# Patient Record
Sex: MALE | Race: WHITE | HISPANIC OR LATINO | Employment: FULL TIME | URBAN - METROPOLITAN AREA
[De-identification: names, ages, dates, MRNs, and addresses within clinical notes are randomized per-mention and may not be internally consistent; named-entity substitution may affect disease eponyms.]

---

## 2022-12-16 ENCOUNTER — HOSPITAL ENCOUNTER (EMERGENCY)
Facility: HOSPITAL | Age: 22
Discharge: HOME | End: 2022-12-16
Attending: EMERGENCY MEDICINE

## 2022-12-16 VITALS
DIASTOLIC BLOOD PRESSURE: 87 MMHG | WEIGHT: 150 LBS | HEART RATE: 92 BPM | BODY MASS INDEX: 22.73 KG/M2 | SYSTOLIC BLOOD PRESSURE: 143 MMHG | OXYGEN SATURATION: 100 % | TEMPERATURE: 98 F | RESPIRATION RATE: 18 BRPM | HEIGHT: 68 IN

## 2022-12-16 DIAGNOSIS — S01.511A LIP LACERATION, INITIAL ENCOUNTER: Primary | ICD-10-CM

## 2022-12-16 PROCEDURE — 90471 IMMUNIZATION ADMIN: CPT | Performed by: PHYSICIAN ASSISTANT

## 2022-12-16 PROCEDURE — 90715 TDAP VACCINE 7 YRS/> IM: CPT | Performed by: PHYSICIAN ASSISTANT

## 2022-12-16 PROCEDURE — 12013 RPR F/E/E/N/L/M 2.6-5.0 CM: CPT

## 2022-12-16 PROCEDURE — 12042 INTMD RPR N-HF/GENIT2.6-7.5: CPT

## 2022-12-16 PROCEDURE — 99284 EMERGENCY DEPT VISIT MOD MDM: CPT | Mod: 25

## 2022-12-16 PROCEDURE — 25000003 PHARM REV CODE 250: Performed by: PHYSICIAN ASSISTANT

## 2022-12-16 PROCEDURE — 63600175 PHARM REV CODE 636 W HCPCS: Performed by: PHYSICIAN ASSISTANT

## 2022-12-16 RX ORDER — LIDOCAINE HYDROCHLORIDE 10 MG/ML
10 INJECTION, SOLUTION EPIDURAL; INFILTRATION; INTRACAUDAL; PERINEURAL
Status: COMPLETED | OUTPATIENT
Start: 2022-12-16 | End: 2022-12-16

## 2022-12-16 RX ORDER — CEPHALEXIN 500 MG/1
500 CAPSULE ORAL
Status: COMPLETED | OUTPATIENT
Start: 2022-12-16 | End: 2022-12-16

## 2022-12-16 RX ORDER — CEPHALEXIN 500 MG/1
500 CAPSULE ORAL EVERY 8 HOURS
Qty: 15 CAPSULE | Refills: 0 | Status: SHIPPED | OUTPATIENT
Start: 2022-12-16 | End: 2022-12-21

## 2022-12-16 RX ORDER — IBUPROFEN 600 MG/1
600 TABLET ORAL EVERY 6 HOURS PRN
Qty: 30 TABLET | Refills: 0 | Status: SHIPPED | OUTPATIENT
Start: 2022-12-16

## 2022-12-16 RX ADMIN — TETANUS TOXOID, REDUCED DIPHTHERIA TOXOID AND ACELLULAR PERTUSSIS VACCINE, ADSORBED 0.5 ML: 5; 2.5; 8; 8; 2.5 SUSPENSION INTRAMUSCULAR at 09:12

## 2022-12-16 RX ADMIN — CEPHALEXIN 500 MG: 500 CAPSULE ORAL at 09:12

## 2022-12-16 RX ADMIN — LIDOCAINE HYDROCHLORIDE 100 MG: 10 INJECTION, SOLUTION EPIDURAL; INFILTRATION; INTRACAUDAL; PERINEURAL at 09:12

## 2022-12-16 NOTE — ED NOTES
"Patient presents to ED from work due to injury to right side of upper lip and face from work related accident. Patient received "kick back from a chop saw while cutting a water line at work". Patient awake and alert, in stable condition. Patient is able to speak in full sentences, but states it is painful, denies chest pain or SOB. Bleeding appears controlled at this time. Laceration tray set up @ bedside.   "

## 2022-12-16 NOTE — DISCHARGE INSTRUCTIONS

## 2022-12-16 NOTE — ED NOTES
Discharge instructions and prescriptions reviewed with patient. Patient verbalizes understanding.  MARGARET

## 2022-12-16 NOTE — ED PROVIDER NOTES
"Encounter Date: 12/16/2022       History     Chief Complaint   Patient presents with    Laceration     Laceration noted to right upper lip and right side of face, from kick back from a chop saw while cutting a water line at work, unknown tetanus status      22-year-old male presents ED with concern of laceration to right side of upper lip obtained just prior to arrival while attempting to cut concrete with large "concrete saw".  Bleeding quickly controlled with pressure.  Pain is sharp, worse with touch, nonradiating, severity 7/10.  Denying facial numbness.  Unsure of last tetanus.  No other acute lines at this time.      Review of patient's allergies indicates:  No Known Allergies  No past medical history on file.  No past surgical history on file.  No family history on file.  Social History     Tobacco Use    Smoking status: Every Day     Types: Cigarettes, Vaping with nicotine    Smokeless tobacco: Never   Substance Use Topics    Drug use: Yes     Types: Marijuana     Review of Systems   Skin:  Positive for wound.   Neurological:  Negative for weakness and numbness.     Physical Exam     Initial Vitals [12/16/22 0909]   BP Pulse Resp Temp SpO2   (!) 143/87 92 18 97.9 °F (36.6 °C) 100 %      MAP       --         Physical Exam    Nursing note and vitals reviewed.  Constitutional: Vital signs are normal. He appears well-developed and well-nourished. He is cooperative. He does not have a sickly appearance. He does not appear ill. No distress.   HENT:   Head: Normocephalic and atraumatic.   3 cm laceration to right upper lateral lip extending through the vermilion border towards right cheek.  Laceration is roughly 7 mm deep but does not appear to involve any deep tissue or muscle.  Equal movements of mouth bilaterally.  Laceration is not through and through.  Bleeding controlled.  See images below.   Eyes: EOM are normal.   Neck:   Normal range of motion.  Pulmonary/Chest: Effort normal.   Musculoskeletal:      " Cervical back: Normal range of motion.     Neurological: He is alert and oriented to person, place, and time. GCS eye subscore is 4. GCS verbal subscore is 5. GCS motor subscore is 6.   Psychiatric: He has a normal mood and affect. His speech is normal and behavior is normal.             ED Course   Lac Repair    Date/Time: 12/16/2022 11:08 AM  Performed by: Kayode Urbina PA-C  Authorized by: Harman Talavera MD     Consent:     Consent obtained:  Verbal    Consent given by:  Patient    Risks discussed:  Infection, poor cosmetic result and poor wound healing  Laceration details:     Location:  Lip    Lip location:  Lower exterior lip    Length (cm):  3  Exploration:     Hemostasis achieved with:  Direct pressure    Imaging outcome: foreign body not noted      Contaminated: no    Treatment:     Area cleansed with:  Saline    Irrigation method:  Syringe    Debridement:  None  Skin repair:     Repair method:  Sutures    Suture size:  5-0    Suture material:  Prolene    Suture technique:  Simple interrupted    Number of sutures:  9  Repair type:     Repair type:  Intermediate  Post-procedure details:     Procedure completion:  Tolerated well, no immediate complications  Labs Reviewed - No data to display       Imaging Results    None          Medications   LIDOcaine (PF) 10 mg/ml (1%) injection 100 mg (100 mg Infiltration Given by Provider 12/16/22 0940)   Tdap (BOOSTRIX) vaccine injection 0.5 mL (0.5 mLs Intramuscular Given 12/16/22 0943)   cephALEXin capsule 500 mg (500 mg Oral Given 12/16/22 0939)     Medical Decision Making:   Initial Assessment:   Patient presents with concern of laceration to right side of face obtained by concrete saw that occurred just prior to arrival.  Bleeding controlled.  Differential Diagnosis:   Laceration, abrasion  ED Management:  Laceration site was thoroughly cleaned at bedside.  Tetanus updated.  He was given 1st dose of Keflex in ED. lengthy discussion was made with patient  regarding repairing of his laceration.  Risks including but not limited to delayed wound healing, scarring and/or infection were discussed.    Laceration was closed at bedside.  Patient tolerated well.  Prescription for Motrin and Keflex.  Ambulatory referral sent to plastics for follow-up.  Ambulatory referral sent to Occupational Medicine for management of patient's worker's compensation.  Encouraged to keep area clean and dry, monitor wound healing closely, cool compress, and ED return precautions.  Patient states his understanding and agrees with plan.                        Clinical Impression:   Final diagnoses:  [S01.511A] Lip laceration, initial encounter (Primary)        ED Disposition Condition    Discharge Stable          ED Prescriptions       Medication Sig Dispense Start Date End Date Auth. Provider    cephALEXin (KEFLEX) 500 MG capsule Take 1 capsule (500 mg total) by mouth every 8 (eight) hours. for 5 days 15 capsule 12/16/2022 12/21/2022 Kayode Urbina PA-C    ibuprofen (ADVIL,MOTRIN) 600 MG tablet Take 1 tablet (600 mg total) by mouth every 6 (six) hours as needed for Pain. 30 tablet 12/16/2022 -- Kayode Urbina PA-C          Follow-up Information       Follow up With Specialties Details Why Contact Info    Your Doctor  In 10 days For wound re-check, For suture removal              Kayode Urbina PA-C  12/16/22 6726       Kayode Urbina PA-C  12/16/22 2833

## 2022-12-27 ENCOUNTER — OFFICE VISIT (OUTPATIENT)
Dept: URGENT CARE | Facility: CLINIC | Age: 22
End: 2022-12-27
Payer: COMMERCIAL

## 2022-12-27 VITALS
HEART RATE: 56 BPM | WEIGHT: 160 LBS | HEIGHT: 69 IN | RESPIRATION RATE: 16 BRPM | TEMPERATURE: 99 F | OXYGEN SATURATION: 97 % | SYSTOLIC BLOOD PRESSURE: 122 MMHG | BODY MASS INDEX: 23.7 KG/M2 | DIASTOLIC BLOOD PRESSURE: 67 MMHG

## 2022-12-27 DIAGNOSIS — S01.511A LACERATION OF UPPER LIP WITH COMPLICATION, INITIAL ENCOUNTER: Primary | ICD-10-CM

## 2022-12-27 DIAGNOSIS — Z48.02 ENCOUNTER FOR REMOVAL OF SUTURES: ICD-10-CM

## 2022-12-27 DIAGNOSIS — S01.81XA FACIAL LACERATION, INITIAL ENCOUNTER: ICD-10-CM

## 2022-12-27 PROCEDURE — 99203 OFFICE O/P NEW LOW 30 MIN: CPT | Mod: S$GLB,,, | Performed by: EMERGENCY MEDICINE

## 2022-12-27 PROCEDURE — 99203 PR OFFICE/OUTPT VISIT, NEW, LEVL III, 30-44 MIN: ICD-10-PCS | Mod: S$GLB,,, | Performed by: EMERGENCY MEDICINE

## 2022-12-27 NOTE — PROGRESS NOTES
Subjective:       Patient ID: Azar Heller is a 22 y.o. male.    Chief Complaint: Facial Injury (Laceration (upper lip))    New Workers comp injury DOI 12/16/2022 - Patient is here following up from the ER. He was treated at [facility] - Patient had 9 sutures placed. 3 are still in tact. He said when he went to wash his face the scab came off with a few or the sutures. Pain 0/10. TD was updated.     PATIENT PRESENTS FOR WOUND CHECK AND SUTURE REMOVAL TO RIGHT UPPER LIP AND FACIAL LACERATION THAT WAS REPAIRED IN THE EMERGENCY DEPARTMENT.  TETANUS SHOT WAS UPDATED AT THAT TIME.  NO SIGNS OF INFECTION.  WOUND IS SCABBED OVER AND HE DOES HAVE FOR SUTURES REMAINING.  NO LOOSE TEETH.  HE IS AND HAS BEEN WORKING AND WILL WORK REGULAR DUTY WITHOUT RESTRICTIONS.  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  DISCHARGE FROM OCCUPATIONAL HEALTH.    Facial Injury   The incident occurred more than 1 week ago. The injury mechanism was a direct blow. There was no loss of consciousness. The volume of blood lost was moderate. The pain is at a severity of 0/10. The patient is experiencing no pain. He has tried acetaminophen for the symptoms. The treatment provided mild relief.       ROS    HENT:  Positive for mouth sores and facial trauma. Negative for facial swelling.    Musculoskeletal:  Positive for pain.   Skin:  Positive for color change, wound, abrasion and laceration. Negative for erythema and bruising.      Objective:      Physical Exam  Vitals and nursing note reviewed.   Constitutional:       Appearance: He is well-developed.   HENT:      Head: Normocephalic and atraumatic. No abrasion, contusion or laceration.      Right Ear: External ear normal.      Left Ear: External ear normal.      Nose: Nose normal.   Eyes:      General: Lids are normal.      Conjunctiva/sclera: Conjunctivae normal.      Pupils: Pupils are equal, round, and reactive to light.   Neck:      Trachea: Trachea and phonation normal.   Cardiovascular:      Rate and Rhythm:  Normal rate and regular rhythm.      Heart sounds: Normal heart sounds.   Pulmonary:      Effort: Pulmonary effort is normal. No respiratory distress.      Breath sounds: Normal breath sounds. No stridor.   Musculoskeletal:         General: Normal range of motion.      Cervical back: Full passive range of motion without pain and neck supple.   Skin:     General: Skin is warm and dry.      Capillary Refill: Capillary refill takes less than 2 seconds.      Findings: No abrasion, bruising, burn, ecchymosis, erythema, laceration, lesion or rash.      Comments: HEALED RIGHT UPPER LIP LACERATION EXTENDING TO THE CHEEK WITH 4 SUTURES IN PLACE WITHOUT ANY DEHISCENCE OR BLEEDING OR SIGNS OF INFECTION.  SUTURES REMOVED X4.   Neurological:      Mental Status: He is alert and oriented to person, place, and time.   Psychiatric:         Speech: Speech normal.         Behavior: Behavior normal.         Thought Content: Thought content normal.         Judgment: Judgment normal.       Assessment:       1. Laceration of upper lip with complication, initial encounter    2. Facial laceration, initial encounter    3. Encounter for removal of sutures          Plan:       PATIENT PRESENTS FOR WOUND CHECK AND SUTURE REMOVAL TO RIGHT UPPER LIP AND FACIAL LACERATION THAT WAS REPAIRED IN THE EMERGENCY DEPARTMENT.  TETANUS SHOT WAS UPDATED AT THAT TIME.  NO SIGNS OF INFECTION.  WOUND IS SCABBED OVER AND HE DOES HAVE FOR SUTURES REMAINING.  NO LOOSE TEETH.  HE IS AND HAS BEEN WORKING AND WILL WORK REGULAR DUTY WITHOUT RESTRICTIONS.  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  DISCHARGE FROM OCCUPATIONAL HEALTH.     Patient Instructions: Attention not to aggravate affected area   Restrictions: Regular Duty, Discharged from Occupational Health  Follow up if symptoms worsen or fail to improve.

## 2022-12-27 NOTE — LETTER
[facility]  [address] [place] [address]  Phone: [phone]  Fax: [phone]  [facility]: [email or web address]    Pt Name: Azar Heller  Injury Date: 12/16/2022   Employee ID: [id number] Date of First Treatment: 12/27/2022   Company: [employer]      Appointment Time:  Arrived:1:05 PM   Provider: Lamont Ashley MD Time Out: 2:28 PM     Office Treatment:   1. Laceration of upper lip with complication, initial encounter    2. Facial laceration, initial encounter    3. Encounter for removal of sutures          Patient Instructions: Attention not to aggravate affected area      Restrictions: Regular Duty, Discharged from Occupational Health     Return As needed. MIL